# Patient Record
Sex: MALE | Race: BLACK OR AFRICAN AMERICAN | NOT HISPANIC OR LATINO | Employment: PART TIME | URBAN - METROPOLITAN AREA
[De-identification: names, ages, dates, MRNs, and addresses within clinical notes are randomized per-mention and may not be internally consistent; named-entity substitution may affect disease eponyms.]

---

## 2020-02-10 ENCOUNTER — HOSPITAL ENCOUNTER (EMERGENCY)
Facility: HOSPITAL | Age: 30
Discharge: HOME | End: 2020-02-10
Attending: INTERNAL MEDICINE
Payer: MEDICAID

## 2020-02-10 VITALS
HEIGHT: 74 IN | BODY MASS INDEX: 23.74 KG/M2 | DIASTOLIC BLOOD PRESSURE: 96 MMHG | RESPIRATION RATE: 22 BRPM | WEIGHT: 185 LBS | TEMPERATURE: 99 F | OXYGEN SATURATION: 98 % | SYSTOLIC BLOOD PRESSURE: 149 MMHG | HEART RATE: 133 BPM

## 2020-02-10 DIAGNOSIS — F41.0 PANIC ATTACK: Primary | ICD-10-CM

## 2020-02-10 PROCEDURE — 99283 EMERGENCY DEPT VISIT LOW MDM: CPT | Mod: ER

## 2020-02-10 PROCEDURE — 25000003 PHARM REV CODE 250: Mod: ER | Performed by: INTERNAL MEDICINE

## 2020-02-10 RX ORDER — HYDROXYZINE PAMOATE 25 MG/1
50 CAPSULE ORAL
Status: COMPLETED | OUTPATIENT
Start: 2020-02-10 | End: 2020-02-10

## 2020-02-10 RX ADMIN — HYDROXYZINE PAMOATE 50 MG: 25 CAPSULE ORAL at 11:02

## 2020-02-11 NOTE — ED PROVIDER NOTES
Encounter Date: 2/10/2020    SCRIBE #1 NOTE: I, Lizzette Jameson, am scribing for, and in the presence of,  Dr. Woodruff. I have scribed the following portions of the note - Other sections scribed: HPI, ROS, PE.       History     Chief Complaint   Patient presents with    Panic Attack     PT REPORTS NOT SLEEPING FOR THE LAST 5 DAYS, REPORTS BECAME VERY ANXIOUS AND HAD A PANIC ATTACK 1 HOUR AGO, PT DENIES S/I, H/I     Vincent Arias is a 29 y.o. male who presents to the ED s/p a panic attack one hour ago. He reports having SOB, sudden studdering, sweats and knots in his stomach. He can not identify how long the panic attack lasted. He denies drinking any alcohol or uptake of any street drugs but report usage of marijuana earlier today , but denies using any other drugs. He reports feeling very anxious causing insomnia for the last 5 days. His friend reports she believes he is still in a panic because he is not acting normal.    The history is provided by the patient. No  was used.     Review of patient's allergies indicates:   Allergen Reactions    Iodine and iodide containing products Swelling     Past Medical History:   Diagnosis Date    PTSD (post-traumatic stress disorder)      History reviewed. No pertinent surgical history.  No family history on file.  Social History     Tobacco Use    Smoking status: Current Every Day Smoker     Packs/day: 1.00     Types: Cigarettes    Smokeless tobacco: Never Used   Substance Use Topics    Alcohol use: Yes     Comment: SOCIALLY    Drug use: Never     Review of Systems   Constitutional: Negative for fever.   HENT: Negative for sore throat.    Respiratory: Positive for shortness of breath.    Cardiovascular: Negative for chest pain.   Gastrointestinal: Positive for abdominal pain (knots). Negative for diarrhea, nausea and vomiting.   Genitourinary: Negative for dysuria.   Musculoskeletal: Negative for back pain.   Skin: Negative for rash.    Neurological: Negative for weakness and headaches.   Psychiatric/Behavioral: Negative for behavioral problems, hallucinations and suicidal ideas. The patient is nervous/anxious.    All other systems reviewed and are negative.      Physical Exam     Initial Vitals [02/10/20 2222]   BP Pulse Resp Temp SpO2   (!) 149/96 (!) 133 (!) 22 98.7 °F (37.1 °C) 98 %      MAP       --         Physical Exam    Nursing note and vitals reviewed.  Constitutional: He appears well-developed and well-nourished.   HENT:   Head: Normocephalic and atraumatic.   Eyes: Conjunctivae are normal.   Neck: Normal range of motion. Neck supple.   Cardiovascular: Normal rate, regular rhythm and normal heart sounds. Exam reveals no gallop and no friction rub.    No murmur heard.  Pulmonary/Chest: Breath sounds normal. No respiratory distress. He has no wheezes. He has no rhonchi. He has no rales.   Abdominal: Soft. There is no tenderness.   Musculoskeletal: Normal range of motion. He exhibits no edema.   Neurological: He is alert and oriented to person, place, and time. He has normal strength. No cranial nerve deficit or sensory deficit. GCS score is 15. GCS eye subscore is 4. GCS verbal subscore is 5. GCS motor subscore is 6.   Skin: Skin is warm and dry.   Psychiatric: He has a normal mood and affect. His behavior is normal. Judgment and thought content normal. He expresses no homicidal and no suicidal ideation.         ED Course   Procedures  Labs Reviewed - No data to display  EKG Readings: (Independently Interpreted)   Initial Reading: No STEMI. Rhythm: Normal Sinus Rhythm. Heart Rate: 100. Ectopy: No Ectopy. Conduction: Normal. ST Segments: Normal ST Segments. T Waves: Normal. Clinical Impression: Normal Sinus Rhythm       Imaging Results    None          Medical Decision Making:   Initial Assessment:   Vincent Arias is a 29 y.o. male who presents to the ED s/p having a panic attack one hour ago. He reports feeling very anxious causing  insomnia for the last 5 days.   ED Management:   Patient's heart rate improved and he received hydroxyzine in the emergency department.  Instructions for panic attack were given and the patient was advised to follow up with his primary care physician tomorrow for re-evaluation /return to the emergency department   Ifcondition worsens.            Scribe Attestation:   Scribe #1: I performed the above scribed service and the documentation accurately describes the services I performed. I attest to the accuracy of the note.    This document was produced by a scribe under my direction and in my presence. I agree with the content of the note and have made any necessary edits.     Dr. Woodruff    02/11/2020 1:32 AM                        Clinical Impression:     1. Panic attack            Disposition:   Disposition: Discharged  Condition: Stable                     Jaime Woodruff MD  02/11/20 0133